# Patient Record
Sex: MALE | Race: WHITE | NOT HISPANIC OR LATINO | Employment: UNEMPLOYED | URBAN - METROPOLITAN AREA
[De-identification: names, ages, dates, MRNs, and addresses within clinical notes are randomized per-mention and may not be internally consistent; named-entity substitution may affect disease eponyms.]

---

## 2021-07-28 ENCOUNTER — HOSPITAL ENCOUNTER (EMERGENCY)
Facility: HOSPITAL | Age: 46
Discharge: HOME | End: 2021-07-28
Attending: EMERGENCY MEDICINE | Admitting: EMERGENCY MEDICINE
Payer: COMMERCIAL

## 2021-07-28 VITALS
SYSTOLIC BLOOD PRESSURE: 170 MMHG | RESPIRATION RATE: 18 BRPM | DIASTOLIC BLOOD PRESSURE: 83 MMHG | OXYGEN SATURATION: 98 % | WEIGHT: 315 LBS | HEART RATE: 67 BPM | HEIGHT: 75 IN | TEMPERATURE: 97.6 F | BODY MASS INDEX: 39.17 KG/M2

## 2021-07-28 DIAGNOSIS — T63.621A: Primary | ICD-10-CM

## 2021-07-28 PROCEDURE — 99283 EMERGENCY DEPT VISIT LOW MDM: CPT

## 2021-07-28 PROCEDURE — 99284 EMERGENCY DEPT VISIT MOD MDM: CPT | Performed by: EMERGENCY MEDICINE

## 2021-07-28 RX ORDER — DIAPER,BRIEF,INFANT-TODD,DISP
EACH MISCELLANEOUS 2 TIMES DAILY
Qty: 30 G | Refills: 0 | Status: SHIPPED | OUTPATIENT
Start: 2021-07-28

## 2021-07-28 RX ORDER — OXYCODONE HYDROCHLORIDE AND ACETAMINOPHEN 5; 325 MG/1; MG/1
1 TABLET ORAL EVERY 4 HOURS PRN
Qty: 10 TABLET | Refills: 0 | Status: SHIPPED | OUTPATIENT
Start: 2021-07-28 | End: 2021-08-07

## 2021-07-28 NOTE — ED PROVIDER NOTES
History  Chief Complaint   Patient presents with    Leg Pain     pt was stung by a jelly fish in both feet/legs yesterday and states nothing helps the pain  pt took 440mg of naproxen at 0300 and 25 benedryl  49 yo M with PMH of HTN presents to ED after an unknown type of jellyfish sting yesterday  "felt like lightening bolt " across lower legs  Tried vinegar, motrin/tylenol/hydrocortisone cream without much relief  No other complaints, no SOB or throat/tongue swelling  No similar in past  Was at [address] [place] [address]  Large jellyfish, about 3 ft long tentacles  History provided by:  Patient and medical records   used: No    Leg Pain  Location:  Leg  Leg location:  L lower leg and R lower leg  Pain details:     Quality:  Burning    Radiates to:  Does not radiate    Severity:  Moderate    Onset quality:  Gradual    Timing:  Constant    Progression:  Unchanged  Chronicity:  New  Dislocation: no    Foreign body present:  No foreign bodies  Tetanus status:  Up to date  Prior injury to area:  No  Relieved by:  Nothing  Ineffective treatments:  NSAIDs  Associated symptoms: swelling    Associated symptoms: no back pain, no decreased ROM, no fatigue, no fever, no itching, no muscle weakness, no neck pain, no numbness, no stiffness and no tingling        None       Past Medical History:   Diagnosis Date    Hypertension        History reviewed  No pertinent surgical history  History reviewed  No pertinent family history  I have reviewed and agree with the history as documented  E-Cigarette/Vaping     E-Cigarette/Vaping Substances     Social History     Tobacco Use    Smoking status: Never Smoker    Smokeless tobacco: Never Used   Substance Use Topics    Alcohol use: Yes     Comment: socially    Drug use: Not on file       Review of Systems   Constitutional: Negative for chills, diaphoresis, fatigue, fever and unexpected weight change     HENT: Negative for congestion, ear pain, rhinorrhea, sore throat, trouble swallowing and voice change  Eyes: Negative for pain and visual disturbance  Respiratory: Negative for cough, chest tightness and shortness of breath  Cardiovascular: Negative for chest pain, palpitations and leg swelling  Gastrointestinal: Negative for abdominal pain, blood in stool, constipation, diarrhea, nausea and vomiting  Genitourinary: Negative for difficulty urinating and hematuria  Musculoskeletal: Negative for arthralgias, back pain, neck pain and stiffness  Skin: Positive for wound  Negative for itching and rash  Neurological: Negative for dizziness, syncope, light-headedness and headaches  Psychiatric/Behavioral: Negative for confusion and suicidal ideas  The patient is not nervous/anxious  Physical Exam  Physical Exam  Constitutional:       General: He is not in acute distress  Appearance: He is well-developed  He is not diaphoretic  HENT:      Head: Normocephalic and atraumatic  Right Ear: External ear normal       Left Ear: External ear normal       Nose: Nose normal    Eyes:      General: No scleral icterus  Right eye: No discharge  Left eye: No discharge  Conjunctiva/sclera: Conjunctivae normal       Pupils: Pupils are equal, round, and reactive to light  Neck:      Vascular: No JVD  Trachea: No tracheal deviation  Cardiovascular:      Rate and Rhythm: Normal rate and regular rhythm  Heart sounds: Normal heart sounds  No murmur heard  No friction rub  No gallop  Pulmonary:      Effort: Pulmonary effort is normal  No respiratory distress  Breath sounds: Normal breath sounds  No stridor  No wheezing or rales  Chest:      Chest wall: No tenderness  Abdominal:      General: Bowel sounds are normal  There is no distension  Palpations: Abdomen is soft  Tenderness: There is no abdominal tenderness  There is no guarding or rebound     Musculoskeletal:         General: Swelling and tenderness present  No deformity  Normal range of motion  Cervical back: Normal range of motion and neck supple  Comments: Pt with redness/swelling and tenderness of both anterior lower legs, starting about mid calf down to ankles  No posterior calf tenderness  No stingers or tentacles present  NV intact  Able to ambulate  No other rashes/lesions  Lymphadenopathy:      Cervical: No cervical adenopathy  Skin:     General: Skin is warm and dry  Findings: No rash  Neurological:      General: No focal deficit present  Mental Status: He is alert and oriented to person, place, and time  Cranial Nerves: No cranial nerve deficit  Sensory: No sensory deficit  Coordination: Coordination normal    Psychiatric:         Behavior: Behavior normal          Vital Signs  ED Triage Vitals   Temperature Pulse Respirations Blood Pressure SpO2   07/28/21 0431 07/28/21 0428 07/28/21 0428 07/28/21 0431 07/28/21 0428   97 6 °F (36 4 °C) 67 18 170/83 98 %      Temp Source Heart Rate Source Patient Position - Orthostatic VS BP Location FiO2 (%)   07/28/21 0431 07/28/21 0428 07/28/21 0428 07/28/21 0428 --   Temporal Monitor Sitting Left arm       Pain Score       07/28/21 0428       9           Vitals:    07/28/21 0428 07/28/21 0431   BP:  170/83   Pulse: 67    Patient Position - Orthostatic VS: Sitting          Visual Acuity      ED Medications  Medications - No data to display    Diagnostic Studies  Results Reviewed     None                 No orders to display              Procedures  Procedures         ED Course  ED Course as of Jul 28 0459   Wed Jul 28, 2021 0457 Discussed supportive care, RTED instructions, and f/u with PCP  Recommend motrin/tylenol prn according to instructions, elevated, vinegar, and hydrocortisone ointment BID  For extreme pain, can do percocet, no driving/heavy machinery/etoh, recommend stool softener  Pt agrees with plan  Ambulated without issue                                  SBIRT 20yo+      Most Recent Value   SBIRT (22 yo +)   In order to provide better care to our patients, we are screening all of our patients for alcohol and drug use  Would it be okay to ask you these screening questions? Yes Filed at: 07/28/2021 0458   Initial Alcohol Screen: US AUDIT-C    1  How often do you have a drink containing alcohol? 2 Filed at: 07/28/2021 0458   2  How many drinks containing alcohol do you have on a typical day you are drinking? 2 Filed at: 07/28/2021 0458   3a  Male UNDER 65: How often do you have five or more drinks on one occasion? 0 Filed at: 07/28/2021 0458   3b  FEMALE Any Age, or MALE 65+: How often do you have 4 or more drinks on one occassion? 0 Filed at: 07/28/2021 0458   Audit-C Score  4 Filed at: 07/28/2021 1187   VANESA: How many times in the past year have you    Used an illegal drug or used a prescription medication for non-medical reasons? Never Filed at: 07/28/2021 0458                    MDM  Number of Diagnoses or Management Options  Jellyfish sting: new and requires workup  Risk of Complications, Morbidity, and/or Mortality  Presenting problems: low  Diagnostic procedures: minimal  Management options: minimal    Patient Progress  Patient progress: stable      Disposition  Final diagnoses:   Jellyfish sting     Time reflects when diagnosis was documented in both MDM as applicable and the Disposition within this note     Time User Action Codes Description Comment    7/28/2021  4:49 AM Antoinette Lyons Add [address] sting       ED Disposition     ED Disposition Condition Date/Time Comment    Discharge Stable Wed Jul 28, 2021  4:49 AM Diogo Ledbetter discharge to home/self care              Follow-up Information     Follow up With Specialties Details Why Contact Info Additional Matilda Ron [address] [facility] Emergency Medicine  If symptoms worsen [address] [place] [address] [phone]   [facility] Emergency Department, [address] [facility], [address] [place] [address]          Discharge Medication List as of 7/28/2021  4:51 AM      START taking these medications    Details   hydrocortisone 1 % ointment Apply topically 2 (two) times a day, Starting Wed 7/28/2021, Normal      oxyCODONE-acetaminophen (PERCOCET) 5-325 mg per tablet Take 1 tablet by mouth every 4 (four) hours as needed for moderate pain for up to 10 daysMax Daily Amount: 6 tablets, Starting Wed 7/28/2021, Until Sat 8/7/2021 at 2359, Normal           No discharge procedures on file      PDMP Review     None          ED Provider  Electronically Signed by           Karl Saldaña MD  07/28/21 0858

## 2021-07-28 NOTE — Clinical Note
Rhys Card was seen and treated in our emergency department on 7/28/2021  Diagnosis:     Misti Gutierrez  is off the rest of the shift today, may return to work on return date  He may return on this date: 07/29/2021         If you have any questions or concerns, please don't hesitate to call        Tatyana Angel MD    ______________________________           _______________          _______________  Hospital Representative                              Date                                Time

## 2021-08-01 ENCOUNTER — OFFICE VISIT (OUTPATIENT)
Dept: URGENT CARE | Facility: CLINIC | Age: 46
End: 2021-08-01
Payer: COMMERCIAL

## 2021-08-01 VITALS
OXYGEN SATURATION: 99 % | WEIGHT: 315 LBS | BODY MASS INDEX: 38.36 KG/M2 | SYSTOLIC BLOOD PRESSURE: 145 MMHG | TEMPERATURE: 97.4 F | HEART RATE: 89 BPM | DIASTOLIC BLOOD PRESSURE: 88 MMHG | HEIGHT: 76 IN | RESPIRATION RATE: 20 BRPM

## 2021-08-01 DIAGNOSIS — T63.621A JELLYFISH STING, ACCIDENTAL OR UNINTENTIONAL, INITIAL ENCOUNTER: Primary | ICD-10-CM

## 2021-08-01 PROCEDURE — 99213 OFFICE O/P EST LOW 20 MIN: CPT | Performed by: PHYSICIAN ASSISTANT

## 2021-08-01 RX ORDER — AMLODIPINE BESYLATE 5 MG/1
5 TABLET ORAL DAILY
COMMUNITY
Start: 2021-07-14

## 2021-08-01 RX ORDER — CEPHALEXIN 500 MG/1
500 CAPSULE ORAL EVERY 8 HOURS SCHEDULED
Qty: 21 CAPSULE | Refills: 0 | Status: SHIPPED | OUTPATIENT
Start: 2021-08-01 | End: 2021-08-08

## 2021-08-01 RX ORDER — LOSARTAN POTASSIUM 100 MG/1
TABLET ORAL EVERY 24 HOURS
COMMUNITY

## 2021-08-01 RX ORDER — PREDNISONE 10 MG/1
TABLET ORAL
Qty: 24 TABLET | Refills: 0 | Status: SHIPPED | OUTPATIENT
Start: 2021-08-01

## 2021-08-01 NOTE — LETTER
August 1, 2021     Patient: Samina Santiago   YOB: 1975   Date of Visit: 8/1/2021       To Whom it May Concern:    Charline Campa was seen in my clinic on 8/1/2021  He may return to work on 8/4/2021  If you have any questions or concerns, please don't hesitate to call           Sincerely,          Kiet Tam PA-C        CC: No Recipients

## 2021-08-01 NOTE — PROGRESS NOTES
[facility]        NAME: Yasmeen Mccann is a 50 y o  male  : 1975    MRN: [id number]  DATE:  2021  TIME: 10:17 AM    Assessment and Plan   Jellyfish sting, accidental or unintentional, initial encounter [[id number]]  1  Jellyfish sting, accidental or unintentional, initial encounter  predniSONE 10 mg tablet    cephalexin (KEFLEX) 500 mg capsule         Patient Instructions      patient continues to have pain, discomfort, and blistering from jellyfish stings of his lower legs  I will prescribe him an oral prednisone taper and an oral antibiotic and recommended keeping open areas clean, dressed, and dry  He is to elevate his extremities with nonweightbearing  Discussed pain control  Should be re-evaluated if condition persists or worsens  Follow up with PCP in 3-5 days  Proceed to  ER if symptoms worsen  Chief Complaint     Chief Complaint   Patient presents with    Wound Check     Pt stung by jelly fish bilateral ankles tuesday at [facility]  Pt seen in ED wednesday morning, given hydrocortison cream and Percocet%  Pt now developing blisters reporting 8/10 pain, edema R foot and ankle, L ankle resolving with trace pitting edema and redness  History of Present Illness         Patient presents four days after being seen in the ED for [place]fish st[place]  He reports his legs are very painful and blistering and 1 of the blisters popped open  He reports swelling but denies fever, chills, purulent drainage  Has been applying hydrocortisone cream and taking Percocet  He only takes it sparingly  Review of Systems   Review of Systems   Constitutional: Negative  Respiratory: Negative  Cardiovascular: Negative  Gastrointestinal: Negative  Genitourinary: Negative  Skin: Positive for color change and wound (  Bilateral lower legs)           Current Medications       Current Outpatient Medications:     amLODIPine (NORVASC) 5 mg tablet, Take 5 mg by mouth daily, Disp: , Rfl:     hydrocortisone 1 % ointment, Apply topically 2 (two) times a day, Disp: 30 g, Rfl: 0    losartan (COZAAR) 100 MG tablet, every 24 hours, Disp: , Rfl:     predniSONE 10 mg tablet, 7-0-9-4-3-2-1 taper with food  , Disp: 24 tablet, Rfl: 0    Current Allergies     Allergies as of 08/01/2021    (No Known Allergies)            The following portions of the patient's history were reviewed and updated as appropriate: allergies, current medications, past family history, past medical history, past social history, past surgical history and problem list      Past Medical History:   Diagnosis Date    Hypertension        History reviewed  No pertinent surgical history  History reviewed  No pertinent family history  Medications have been verified  Objective   /88   Pulse 89   Temp (!) 97 4 °F (36 3 °C)   Resp 20   Ht 6' 4" (1 93 m)   Wt (!) 171 kg (376 lb 12 8 oz)   SpO2 99%   BMI 45 87 kg/m²   No LMP for male patient  Physical Exam     Physical Exam  Vitals reviewed  Constitutional:       General: He is not in acute distress  Appearance: He is well-developed  Skin:     Comments: Bilateral lower legs with localized soft tissue swelling, erythema, and areas of blistering but no purulent drainage or red streaking up the legs noted  Neurological:      Mental Status: He is alert and oriented to person, place, and time